# Patient Record
Sex: FEMALE | Race: OTHER | NOT HISPANIC OR LATINO | URBAN - METROPOLITAN AREA
[De-identification: names, ages, dates, MRNs, and addresses within clinical notes are randomized per-mention and may not be internally consistent; named-entity substitution may affect disease eponyms.]

---

## 2017-08-29 ENCOUNTER — OUTPATIENT (OUTPATIENT)
Dept: OUTPATIENT SERVICES | Facility: HOSPITAL | Age: 32
LOS: 1 days | End: 2017-08-29
Payer: COMMERCIAL

## 2017-08-29 DIAGNOSIS — O26.899 OTHER SPECIFIED PREGNANCY RELATED CONDITIONS, UNSPECIFIED TRIMESTER: ICD-10-CM

## 2017-08-29 DIAGNOSIS — Z3A.00 WEEKS OF GESTATION OF PREGNANCY NOT SPECIFIED: ICD-10-CM

## 2017-08-29 LAB
ALBUMIN SERPL ELPH-MCNC: 3.5 G/DL — SIGNIFICANT CHANGE UP (ref 3.3–5)
ALP SERPL-CCNC: 1573 U/L — HIGH (ref 40–120)
ALT FLD-CCNC: 16 U/L — SIGNIFICANT CHANGE UP (ref 10–45)
ANION GAP SERPL CALC-SCNC: 13 MMOL/L — SIGNIFICANT CHANGE UP (ref 5–17)
APPEARANCE UR: CLEAR — SIGNIFICANT CHANGE UP
APTT BLD: 27.3 SEC — LOW (ref 27.5–37.4)
AST SERPL-CCNC: 15 U/L — SIGNIFICANT CHANGE UP (ref 10–40)
BASOPHILS NFR BLD AUTO: 0.3 % — SIGNIFICANT CHANGE UP (ref 0–2)
BILIRUB SERPL-MCNC: 0.3 MG/DL — SIGNIFICANT CHANGE UP (ref 0.2–1.2)
BILIRUB UR-MCNC: NEGATIVE — SIGNIFICANT CHANGE UP
BUN SERPL-MCNC: 6 MG/DL — LOW (ref 7–23)
CALCIUM SERPL-MCNC: 9.2 MG/DL — SIGNIFICANT CHANGE UP (ref 8.4–10.5)
CHLORIDE SERPL-SCNC: 101 MMOL/L — SIGNIFICANT CHANGE UP (ref 96–108)
CO2 SERPL-SCNC: 21 MMOL/L — LOW (ref 22–31)
COLOR SPEC: YELLOW — SIGNIFICANT CHANGE UP
CREAT SERPL-MCNC: 0.5 MG/DL — SIGNIFICANT CHANGE UP (ref 0.5–1.3)
DIFF PNL FLD: NEGATIVE — SIGNIFICANT CHANGE UP
EOSINOPHIL NFR BLD AUTO: 1.5 % — SIGNIFICANT CHANGE UP (ref 0–6)
FIBRINOGEN PPP-MCNC: 586 MG/DL — HIGH (ref 258–438)
GLUCOSE SERPL-MCNC: 105 MG/DL — HIGH (ref 70–99)
GLUCOSE UR QL: NEGATIVE — SIGNIFICANT CHANGE UP
HCT VFR BLD CALC: 34.5 % — SIGNIFICANT CHANGE UP (ref 34.5–45)
HGB BLD-MCNC: 11.3 G/DL — LOW (ref 11.5–15.5)
INR BLD: 0.97 — SIGNIFICANT CHANGE UP (ref 0.88–1.16)
KETONES UR-MCNC: NEGATIVE — SIGNIFICANT CHANGE UP
LDH SERPL L TO P-CCNC: 152 U/L — SIGNIFICANT CHANGE UP (ref 50–242)
LEUKOCYTE ESTERASE UR-ACNC: NEGATIVE — SIGNIFICANT CHANGE UP
LYMPHOCYTES # BLD AUTO: 18.2 % — SIGNIFICANT CHANGE UP (ref 13–44)
MCHC RBC-ENTMCNC: 27.7 PG — SIGNIFICANT CHANGE UP (ref 27–34)
MCHC RBC-ENTMCNC: 32.8 G/DL — SIGNIFICANT CHANGE UP (ref 32–36)
MCV RBC AUTO: 84.6 FL — SIGNIFICANT CHANGE UP (ref 80–100)
MONOCYTES NFR BLD AUTO: 7.5 % — SIGNIFICANT CHANGE UP (ref 2–14)
NEUTROPHILS NFR BLD AUTO: 72.5 % — SIGNIFICANT CHANGE UP (ref 43–77)
NITRITE UR-MCNC: NEGATIVE — SIGNIFICANT CHANGE UP
PH UR: 5.5 — SIGNIFICANT CHANGE UP (ref 5–8)
PLATELET # BLD AUTO: 205 K/UL — SIGNIFICANT CHANGE UP (ref 150–400)
POTASSIUM SERPL-MCNC: 3.8 MMOL/L — SIGNIFICANT CHANGE UP (ref 3.5–5.3)
POTASSIUM SERPL-SCNC: 3.8 MMOL/L — SIGNIFICANT CHANGE UP (ref 3.5–5.3)
PROT SERPL-MCNC: 6.8 G/DL — SIGNIFICANT CHANGE UP (ref 6–8.3)
PROT UR-MCNC: NEGATIVE MG/DL — SIGNIFICANT CHANGE UP
PROTHROM AB SERPL-ACNC: 10.8 SEC — SIGNIFICANT CHANGE UP (ref 9.8–12.7)
RBC # BLD: 4.08 M/UL — SIGNIFICANT CHANGE UP (ref 3.8–5.2)
RBC # FLD: 14.9 % — SIGNIFICANT CHANGE UP (ref 10.3–16.9)
SODIUM SERPL-SCNC: 135 MMOL/L — SIGNIFICANT CHANGE UP (ref 135–145)
SP GR SPEC: 1.01 — SIGNIFICANT CHANGE UP (ref 1–1.03)
URATE SERPL-MCNC: 4 MG/DL — SIGNIFICANT CHANGE UP (ref 2.5–7)
UROBILINOGEN FLD QL: 0.2 E.U./DL — SIGNIFICANT CHANGE UP
WBC # BLD: 8.7 K/UL — SIGNIFICANT CHANGE UP (ref 3.8–10.5)
WBC # FLD AUTO: 8.7 K/UL — SIGNIFICANT CHANGE UP (ref 3.8–10.5)

## 2017-08-29 PROCEDURE — 99214 OFFICE O/P EST MOD 30 MIN: CPT

## 2017-08-29 PROCEDURE — 84550 ASSAY OF BLOOD/URIC ACID: CPT

## 2017-08-29 PROCEDURE — 85730 THROMBOPLASTIN TIME PARTIAL: CPT

## 2017-08-29 PROCEDURE — 81003 URINALYSIS AUTO W/O SCOPE: CPT

## 2017-08-29 PROCEDURE — 80053 COMPREHEN METABOLIC PANEL: CPT

## 2017-08-29 PROCEDURE — 85384 FIBRINOGEN ACTIVITY: CPT

## 2017-08-29 PROCEDURE — 83615 LACTATE (LD) (LDH) ENZYME: CPT

## 2017-08-29 PROCEDURE — 85025 COMPLETE CBC W/AUTO DIFF WBC: CPT

## 2017-08-29 PROCEDURE — 85610 PROTHROMBIN TIME: CPT

## 2017-09-13 ENCOUNTER — INPATIENT (INPATIENT)
Facility: HOSPITAL | Age: 32
LOS: 2 days | Discharge: HOME | End: 2017-09-16
Attending: OBSTETRICS & GYNECOLOGY | Admitting: OBSTETRICS & GYNECOLOGY
Payer: COMMERCIAL

## 2017-09-13 VITALS
HEIGHT: 62 IN | DIASTOLIC BLOOD PRESSURE: 63 MMHG | TEMPERATURE: 98 F | WEIGHT: 216.93 LBS | SYSTOLIC BLOOD PRESSURE: 102 MMHG

## 2017-09-13 DIAGNOSIS — Z3A.00 WEEKS OF GESTATION OF PREGNANCY NOT SPECIFIED: ICD-10-CM

## 2017-09-13 DIAGNOSIS — O26.899 OTHER SPECIFIED PREGNANCY RELATED CONDITIONS, UNSPECIFIED TRIMESTER: ICD-10-CM

## 2017-09-13 LAB
ALBUMIN SERPL ELPH-MCNC: 3.8 G/DL — SIGNIFICANT CHANGE UP (ref 3.3–5)
ALP SERPL-CCNC: 2200 U/L — HIGH (ref 40–120)
ALT FLD-CCNC: 17 U/L — SIGNIFICANT CHANGE UP (ref 10–45)
ANION GAP SERPL CALC-SCNC: 15 MMOL/L — SIGNIFICANT CHANGE UP (ref 5–17)
APPEARANCE UR: CLEAR — SIGNIFICANT CHANGE UP
APTT BLD: 27.3 SEC — LOW (ref 27.5–37.4)
AST SERPL-CCNC: 20 U/L — SIGNIFICANT CHANGE UP (ref 10–40)
BASOPHILS NFR BLD AUTO: 0.2 % — SIGNIFICANT CHANGE UP (ref 0–2)
BILIRUB SERPL-MCNC: 0.3 MG/DL — SIGNIFICANT CHANGE UP (ref 0.2–1.2)
BILIRUB UR-MCNC: NEGATIVE — SIGNIFICANT CHANGE UP
BLD GP AB SCN SERPL QL: NEGATIVE — SIGNIFICANT CHANGE UP
BLD GP AB SCN SERPL QL: NEGATIVE — SIGNIFICANT CHANGE UP
BUN SERPL-MCNC: 6 MG/DL — LOW (ref 7–23)
CALCIUM SERPL-MCNC: 9.6 MG/DL — SIGNIFICANT CHANGE UP (ref 8.4–10.5)
CHLORIDE SERPL-SCNC: 99 MMOL/L — SIGNIFICANT CHANGE UP (ref 96–108)
CO2 SERPL-SCNC: 24 MMOL/L — SIGNIFICANT CHANGE UP (ref 22–31)
COLOR SPEC: YELLOW — SIGNIFICANT CHANGE UP
CREAT SERPL-MCNC: 0.6 MG/DL — SIGNIFICANT CHANGE UP (ref 0.5–1.3)
DIFF PNL FLD: (no result)
EOSINOPHIL NFR BLD AUTO: 0.8 % — SIGNIFICANT CHANGE UP (ref 0–6)
FIBRINOGEN PPP-MCNC: 543 MG/DL — HIGH (ref 258–438)
GLUCOSE SERPL-MCNC: 75 MG/DL — SIGNIFICANT CHANGE UP (ref 70–99)
GLUCOSE UR QL: NEGATIVE — SIGNIFICANT CHANGE UP
HCT VFR BLD CALC: 37.7 % — SIGNIFICANT CHANGE UP (ref 34.5–45)
HGB BLD-MCNC: 12.5 G/DL — SIGNIFICANT CHANGE UP (ref 11.5–15.5)
INR BLD: 0.95 — SIGNIFICANT CHANGE UP (ref 0.88–1.16)
KETONES UR-MCNC: NEGATIVE — SIGNIFICANT CHANGE UP
LDH SERPL L TO P-CCNC: 185 U/L — SIGNIFICANT CHANGE UP (ref 50–242)
LEUKOCYTE ESTERASE UR-ACNC: (no result)
LYMPHOCYTES # BLD AUTO: 20.5 % — SIGNIFICANT CHANGE UP (ref 13–44)
MCHC RBC-ENTMCNC: 28.2 PG — SIGNIFICANT CHANGE UP (ref 27–34)
MCHC RBC-ENTMCNC: 33.2 G/DL — SIGNIFICANT CHANGE UP (ref 32–36)
MCV RBC AUTO: 84.9 FL — SIGNIFICANT CHANGE UP (ref 80–100)
MONOCYTES NFR BLD AUTO: 8.1 % — SIGNIFICANT CHANGE UP (ref 2–14)
NEUTROPHILS NFR BLD AUTO: 70.4 % — SIGNIFICANT CHANGE UP (ref 43–77)
NITRITE UR-MCNC: NEGATIVE — SIGNIFICANT CHANGE UP
PH UR: 6.5 — SIGNIFICANT CHANGE UP (ref 5–8)
PLATELET # BLD AUTO: 203 K/UL — SIGNIFICANT CHANGE UP (ref 150–400)
POTASSIUM SERPL-MCNC: 4.5 MMOL/L — SIGNIFICANT CHANGE UP (ref 3.5–5.3)
POTASSIUM SERPL-SCNC: 4.5 MMOL/L — SIGNIFICANT CHANGE UP (ref 3.5–5.3)
PROT SERPL-MCNC: 7.3 G/DL — SIGNIFICANT CHANGE UP (ref 6–8.3)
PROT UR-MCNC: NEGATIVE MG/DL — SIGNIFICANT CHANGE UP
PROTHROM AB SERPL-ACNC: 10.5 SEC — SIGNIFICANT CHANGE UP (ref 9.8–12.7)
RBC # BLD: 4.44 M/UL — SIGNIFICANT CHANGE UP (ref 3.8–5.2)
RBC # FLD: 15 % — SIGNIFICANT CHANGE UP (ref 10.3–16.9)
RH IG SCN BLD-IMP: NEGATIVE — SIGNIFICANT CHANGE UP
RH IG SCN BLD-IMP: NEGATIVE — SIGNIFICANT CHANGE UP
SODIUM SERPL-SCNC: 138 MMOL/L — SIGNIFICANT CHANGE UP (ref 135–145)
SP GR SPEC: <=1.005 — SIGNIFICANT CHANGE UP (ref 1–1.03)
URATE SERPL-MCNC: 5.2 MG/DL — SIGNIFICANT CHANGE UP (ref 2.5–7)
UROBILINOGEN FLD QL: 0.2 E.U./DL — SIGNIFICANT CHANGE UP
WBC # BLD: 9.5 K/UL — SIGNIFICANT CHANGE UP (ref 3.8–10.5)
WBC # FLD AUTO: 9.5 K/UL — SIGNIFICANT CHANGE UP (ref 3.8–10.5)

## 2017-09-13 PROCEDURE — 93010 ELECTROCARDIOGRAM REPORT: CPT

## 2017-09-13 PROCEDURE — 99222 1ST HOSP IP/OBS MODERATE 55: CPT

## 2017-09-13 RX ORDER — PENICILLIN G POTASSIUM 5000000 [IU]/1
5 POWDER, FOR SOLUTION INTRAMUSCULAR; INTRAPLEURAL; INTRATHECAL; INTRAVENOUS ONCE
Qty: 0 | Refills: 0 | Status: COMPLETED | OUTPATIENT
Start: 2017-09-13 | End: 2017-09-13

## 2017-09-13 RX ORDER — PENICILLIN G POTASSIUM 5000000 [IU]/1
POWDER, FOR SOLUTION INTRAMUSCULAR; INTRAPLEURAL; INTRATHECAL; INTRAVENOUS
Qty: 0 | Refills: 0 | Status: DISCONTINUED | OUTPATIENT
Start: 2017-09-13 | End: 2017-09-15

## 2017-09-13 RX ORDER — SODIUM CHLORIDE 9 MG/ML
1000 INJECTION, SOLUTION INTRAVENOUS
Qty: 0 | Refills: 0 | Status: DISCONTINUED | OUTPATIENT
Start: 2017-09-13 | End: 2017-09-13

## 2017-09-13 RX ORDER — PENICILLIN G POTASSIUM 5000000 [IU]/1
2.5 POWDER, FOR SOLUTION INTRAMUSCULAR; INTRAPLEURAL; INTRATHECAL; INTRAVENOUS EVERY 4 HOURS
Qty: 0 | Refills: 0 | Status: DISCONTINUED | OUTPATIENT
Start: 2017-09-13 | End: 2017-09-15

## 2017-09-13 RX ORDER — OXYTOCIN 10 UNIT/ML
333.33 VIAL (ML) INJECTION
Qty: 20 | Refills: 0 | Status: DISCONTINUED | OUTPATIENT
Start: 2017-09-13 | End: 2017-09-14

## 2017-09-13 RX ORDER — OXYTOCIN 10 UNIT/ML
1 VIAL (ML) INJECTION
Qty: 30 | Refills: 0 | Status: DISCONTINUED | OUTPATIENT
Start: 2017-09-13 | End: 2017-09-16

## 2017-09-13 RX ORDER — SODIUM CHLORIDE 9 MG/ML
1000 INJECTION, SOLUTION INTRAVENOUS
Qty: 0 | Refills: 0 | Status: DISCONTINUED | OUTPATIENT
Start: 2017-09-13 | End: 2017-09-16

## 2017-09-13 RX ORDER — SODIUM CHLORIDE 9 MG/ML
1000 INJECTION, SOLUTION INTRAVENOUS ONCE
Qty: 0 | Refills: 0 | Status: COMPLETED | OUTPATIENT
Start: 2017-09-13 | End: 2017-09-13

## 2017-09-13 RX ORDER — CITRIC ACID/SODIUM CITRATE 300-500 MG
15 SOLUTION, ORAL ORAL ONCE
Qty: 0 | Refills: 0 | Status: DISCONTINUED | OUTPATIENT
Start: 2017-09-13 | End: 2017-09-14

## 2017-09-13 RX ADMIN — SODIUM CHLORIDE 75 MILLILITER(S): 9 INJECTION, SOLUTION INTRAVENOUS at 22:41

## 2017-09-13 RX ADMIN — PENICILLIN G POTASSIUM 200 MILLION UNIT(S): 5000000 POWDER, FOR SOLUTION INTRAMUSCULAR; INTRAPLEURAL; INTRATHECAL; INTRAVENOUS at 22:41

## 2017-09-13 RX ADMIN — Medication 1 MILLIUNIT(S)/MIN: at 22:40

## 2017-09-13 RX ADMIN — SODIUM CHLORIDE 2000 MILLILITER(S): 9 INJECTION, SOLUTION INTRAVENOUS at 18:00

## 2017-09-13 RX ADMIN — SODIUM CHLORIDE 75 MILLILITER(S): 9 INJECTION, SOLUTION INTRAVENOUS at 18:57

## 2017-09-13 RX ADMIN — PENICILLIN G POTASSIUM 200 MILLION UNIT(S): 5000000 POWDER, FOR SOLUTION INTRAMUSCULAR; INTRAPLEURAL; INTRATHECAL; INTRAVENOUS at 18:00

## 2017-09-13 NOTE — PATIENT PROFILE OB - PRENATAL LABORATORY TESTS, INFANT PROFILE
group B strep/blood type/antibody screen VDRL/RPR/HIV/blood type/group B strep/rubella/antibody screen

## 2017-09-13 NOTE — CONSULT NOTE ADULT - ASSESSMENT
32 y/o F with no Cardiac / Arrhythmia Hx now in Labor with contractions and bradycardia likely from increased vagal tone, no syncope, hemodynamically stable. Occasional PVCs noted on EKG.    Increased vagal tone expected in current state and will likely continue with contractions  Agree with hydration  Check electrolytes and replete as needed  PVCs benign and asymptomatic, no treatment required  Will follow post delivery    Case discussed with Dr Dyer 32 y/o F with no Cardiac / Arrhythmia Hx now in Labor with contractions and bradycardia likely from increased vagal tone, no syncope, hemodynamically stable. Occasional PVCs noted on EKG.    Increased vagal tone expected in current state and will likely continue with contractions, no intervention needed for sinus bradycardia at this time  Agree with hydration  Check electrolytes and replete as needed, including Magnesium  PVCs benign and asymptomatic, recommend Echocardiogram post-partem  Will follow post delivery    Case discussed with Dr Dyer

## 2017-09-13 NOTE — PATIENT PROFILE OB - BABY A: MECONIUM PRESENT, DELIVERY
Patient: Keri Nielson    Procedure Summary     Date Anesthesia Start Anesthesia Stop Room / Location    06/22/17 [id number]  [facility] /  [facility]       Procedure Diagnosis Surgeon Provider    ESOPHAGOGASTRODUODENOSCOPY WITH COLD BIOPSIES AND 56 [place] GALLEGOS DILATION (N/A Esophagus) Dyspepsia; Other dysphagia  (Dyspepsia [R10.13]; Other dysphagia [R13.19]) MD Abel Gomes MD          Anesthesia Type: MAC  Last vitals  BP      Temp      Pulse     Resp      SpO2        Post Anesthesia Care and Evaluation    Patient participation: complete - patient participated  Level of consciousness: awake  Pain management: adequate  Airway patency: patent  Anesthetic complications: No anesthetic complications    Cardiovascular status: acceptable  Respiratory status: acceptable  Hydration status: acceptable      
no

## 2017-09-13 NOTE — CONSULT NOTE ADULT - SUBJECTIVE AND OBJECTIVE BOX
Patient is a 31y old  Female who presents with a chief complaint of     HPI:      PAST MEDICAL & SURGICAL HISTORY:      PREVIOUS DIAGNOSTIC TESTING:      ECHO  FINDINGS:    STRESS  FINDINGS:    CATHETERIZATION  FINDINGS:    MEDICATIONS  (STANDING):  citric acid/sodium citrate Solution 15 milliLiter(s) Oral Once  oxytocin Infusion 333.333 milliUNIT(s)/Min (1000 mL/Hr) IV Continuous <Continuous>  penicillin   G  potassium  IVPB      penicillin   G  potassium  IVPB 2.5 Million Unit(s) IV Intermittent every 4 hours  lactated ringers. 1000 milliLiter(s) (75 mL/Hr) IV Continuous <Continuous>  oxytocin Infusion 1 milliUNIT(s)/Min (1 mL/Hr) IV Continuous <Continuous>    MEDICATIONS  (PRN):      FAMILY HISTORY:      SOCIAL HISTORY:    CIGARETTES:    ALCOHOL:    REVIEW OF SYSTEMS      General:	    Skin/Breast:  	  Ophthalmologic:  	  ENMT:	    Respiratory and Thorax:  	  Cardiovascular:	    Gastrointestinal:	    Genitourinary:	    Musculoskeletal:	    Neurological:	    Psychiatric:	    Hematology/Lymphatics:	    Endocrine:	    Allergic/Immunologic:	    Vital Signs Last 24 Hrs  T(C): --  T(F): --  HR: --  BP: --  BP(mean): --  RR: --  SpO2: --    PHYSICAL EXAM:      Constitutional:    Eyes:    ENMT:    Neck:    Breasts:    Back:    Respiratory:    Cardiovascular:    Gastrointestinal:    Genitourinary:    Rectal:    Extremities:    Vascular:    Neurological:    Skin:    Lymph Nodes:    Musculoskeletal:    Psychiatric:            TELEMETRY:    ECG:    I&O's Detail    13 Sep 2017 07:01  -  13 Sep 2017 18:59  --------------------------------------------------------  IN:    lactated ringers.: 1000 mL  Total IN: 1000 mL    OUT:  Total OUT: 0 mL    Total NET: 1000 mL          LABS: Patient is a 31y old  Female who presents with a chief complaint of     HPI:  30 y/o F with no significant Cardiac or medical HX presents to [facility] in labor (3rd pregnancy, previous Hx of Pre-eclampsia), currently in Labor with contractions, Cardiology consulted for bradycardic episodes and PVCs.    Bradycardia associated with contraction episodes, BPs stable during bradycardia, HR ~ 30s in sinus rhythm.    Patient has good functional status otherwise, uncomplicated pregnancy      PAST MEDICAL & SURGICAL HISTORY:  None significant      MEDICATIONS  (STANDING):  citric acid/sodium citrate Solution 15 milliLiter(s) Oral Once  oxytocin Infusion 333.333 milliUNIT(s)/Min (1000 mL/Hr) IV Continuous <Continuous>  penicillin   G  potassium  IVPB      penicillin   G  potassium  IVPB 2.5 Million Unit(s) IV Intermittent every 4 hours  lactated ringers. 1000 milliLiter(s) (75 mL/Hr) IV Continuous <Continuous>  oxytocin Infusion 1 milliUNIT(s)/Min (1 mL/Hr) IV Continuous <Continuous>    MEDICATIONS  (PRN):      FAMILY HISTORY:  No family Hx of Heart disease    SOCIAL HISTORY:  CIGARETTES: denied    ALCOHOL:       REVIEW OF SYSTEMS  Currently in Labor, having contractions      Vital Signs Last 24 Hrs      PHYSICAL EXAM:  GEN: AAOx3, currently contractions  HEENT: WNL  RESP: CTAB  CV: S1, S2, no MR  ABD: Pregnant female, contraction noted during exam  EXT: No edema, pulses x4        ECG: NSR HR 94, PVCs    Monitor noted Michael episodes associated with contractions        I&O's Detail    13 Sep 2017 07:01  -  13 Sep 2017 18:59  --------------------------------------------------------  IN:    lactated ringers.: 1000 mL  Total IN: 1000 mL    OUT:  Total OUT: 0 mL    Total NET: 1000 mL Patient is a 31y old  Female who presents with a chief complaint of     HPI:  30 y/o F with no significant Cardiac or medical HX presents to [facility] in labor (3rd pregnancy, previous Hx of Pre-eclampsia), currently in Labor with contractions, Cardiology consulted for bradycardic episodes and PVCs during labor.    Bradycardia associated with contraction episodes, BPs stable during bradycardia, HR ~ 30s in sinus rhythm.    Patient has good functional status otherwise, uncomplicated pregnancy, no Hx of Syncope, palpitations, chest pain, SOB.      PAST MEDICAL & SURGICAL HISTORY:  None significant      MEDICATIONS  (STANDING):  citric acid/sodium citrate Solution 15 milliLiter(s) Oral Once  oxytocin Infusion 333.333 milliUNIT(s)/Min (1000 mL/Hr) IV Continuous <Continuous>  penicillin   G  potassium  IVPB      penicillin   G  potassium  IVPB 2.5 Million Unit(s) IV Intermittent every 4 hours  lactated ringers. 1000 milliLiter(s) (75 mL/Hr) IV Continuous <Continuous>  oxytocin Infusion 1 milliUNIT(s)/Min (1 mL/Hr) IV Continuous <Continuous>    MEDICATIONS  (PRN):      FAMILY HISTORY:  No family Hx of Heart disease    SOCIAL HISTORY:  CIGARETTES: denied    ALCOHOL: occasional, none during pregnancy      REVIEW OF SYSTEMS  Currently in Labor, having contractions  As above    Vital Signs Last 24 Hrs  /63, HR 70, Room Air SpO2 99, RR 30  PHYSICAL EXAM:   GEN: AAOx3, currently contractions  HEENT: WNL  RESP: CTAB  CV: S1, S2, no MR  ABD: Pregnant female, contraction noted during exam  EXT: No edema, pulses x4        ECG: NSR HR 94, PVCs    Monitor noted Michael episodes associated with contractions        I&O's Detail    13 Sep 2017 07:01  -  13 Sep 2017 18:59  --------------------------------------------------------  IN:    lactated ringers.: 1000 mL  Total IN: 1000 mL    OUT:  Total OUT: 0 mL    Total NET: 1000 mL

## 2017-09-14 LAB — T PALLIDUM AB TITR SER: NEGATIVE — SIGNIFICANT CHANGE UP

## 2017-09-14 PROCEDURE — 93306 TTE W/DOPPLER COMPLETE: CPT | Mod: 26

## 2017-09-14 RX ORDER — MAGNESIUM HYDROXIDE 400 MG/1
30 TABLET, CHEWABLE ORAL
Qty: 0 | Refills: 0 | Status: DISCONTINUED | OUTPATIENT
Start: 2017-09-14 | End: 2017-09-15

## 2017-09-14 RX ORDER — ACETAMINOPHEN 500 MG
650 TABLET ORAL EVERY 6 HOURS
Qty: 0 | Refills: 0 | Status: DISCONTINUED | OUTPATIENT
Start: 2017-09-14 | End: 2017-09-16

## 2017-09-14 RX ORDER — LANOLIN
1 OINTMENT (GRAM) TOPICAL EVERY 6 HOURS
Qty: 0 | Refills: 0 | Status: DISCONTINUED | OUTPATIENT
Start: 2017-09-14 | End: 2017-09-16

## 2017-09-14 RX ORDER — HYDROCORTISONE 1 %
1 OINTMENT (GRAM) TOPICAL EVERY 4 HOURS
Qty: 0 | Refills: 0 | Status: DISCONTINUED | OUTPATIENT
Start: 2017-09-14 | End: 2017-09-16

## 2017-09-14 RX ORDER — GLYCERIN ADULT
1 SUPPOSITORY, RECTAL RECTAL AT BEDTIME
Qty: 0 | Refills: 0 | Status: DISCONTINUED | OUTPATIENT
Start: 2017-09-14 | End: 2017-09-16

## 2017-09-14 RX ORDER — AER TRAVELER 0.5 G/1
1 SOLUTION RECTAL; TOPICAL EVERY 4 HOURS
Qty: 0 | Refills: 0 | Status: DISCONTINUED | OUTPATIENT
Start: 2017-09-14 | End: 2017-09-16

## 2017-09-14 RX ORDER — OXYCODONE AND ACETAMINOPHEN 5; 325 MG/1; MG/1
2 TABLET ORAL EVERY 6 HOURS
Qty: 0 | Refills: 0 | Status: DISCONTINUED | OUTPATIENT
Start: 2017-09-14 | End: 2017-09-16

## 2017-09-14 RX ORDER — DIPHENHYDRAMINE HCL 50 MG
25 CAPSULE ORAL EVERY 6 HOURS
Qty: 0 | Refills: 0 | Status: DISCONTINUED | OUTPATIENT
Start: 2017-09-14 | End: 2017-09-16

## 2017-09-14 RX ORDER — SODIUM CHLORIDE 9 MG/ML
3 INJECTION INTRAMUSCULAR; INTRAVENOUS; SUBCUTANEOUS EVERY 8 HOURS
Qty: 0 | Refills: 0 | Status: DISCONTINUED | OUTPATIENT
Start: 2017-09-14 | End: 2017-09-16

## 2017-09-14 RX ORDER — DIBUCAINE 1 %
1 OINTMENT (GRAM) RECTAL EVERY 4 HOURS
Qty: 0 | Refills: 0 | Status: DISCONTINUED | OUTPATIENT
Start: 2017-09-14 | End: 2017-09-16

## 2017-09-14 RX ORDER — OXYTOCIN 10 UNIT/ML
41.67 VIAL (ML) INJECTION
Qty: 20 | Refills: 0 | Status: DISCONTINUED | OUTPATIENT
Start: 2017-09-14 | End: 2017-09-16

## 2017-09-14 RX ORDER — DOCUSATE SODIUM 100 MG
100 CAPSULE ORAL
Qty: 0 | Refills: 0 | Status: DISCONTINUED | OUTPATIENT
Start: 2017-09-14 | End: 2017-09-16

## 2017-09-14 RX ORDER — INFLUENZA VIRUS VACCINE 15; 15; 15; 15 UG/.5ML; UG/.5ML; UG/.5ML; UG/.5ML
0.5 SUSPENSION INTRAMUSCULAR ONCE
Qty: 0 | Refills: 0 | Status: COMPLETED | OUTPATIENT
Start: 2017-09-14 | End: 2017-09-14

## 2017-09-14 RX ORDER — IBUPROFEN 200 MG
600 TABLET ORAL EVERY 6 HOURS
Qty: 0 | Refills: 0 | Status: DISCONTINUED | OUTPATIENT
Start: 2017-09-14 | End: 2017-09-16

## 2017-09-14 RX ORDER — BENZOCAINE 10 %
1 GEL (GRAM) MUCOUS MEMBRANE EVERY 6 HOURS
Qty: 0 | Refills: 0 | Status: DISCONTINUED | OUTPATIENT
Start: 2017-09-14 | End: 2017-09-16

## 2017-09-14 RX ORDER — SIMETHICONE 80 MG/1
80 TABLET, CHEWABLE ORAL EVERY 6 HOURS
Qty: 0 | Refills: 0 | Status: DISCONTINUED | OUTPATIENT
Start: 2017-09-14 | End: 2017-09-15

## 2017-09-14 RX ORDER — TETANUS TOXOID, REDUCED DIPHTHERIA TOXOID AND ACELLULAR PERTUSSIS VACCINE, ADSORBED 5; 2.5; 8; 8; 2.5 [IU]/.5ML; [IU]/.5ML; UG/.5ML; UG/.5ML; UG/.5ML
0.5 SUSPENSION INTRAMUSCULAR ONCE
Qty: 0 | Refills: 0 | Status: COMPLETED | OUTPATIENT
Start: 2017-09-14 | End: 2018-08-13

## 2017-09-14 RX ADMIN — OXYCODONE AND ACETAMINOPHEN 2 TABLET(S): 5; 325 TABLET ORAL at 21:29

## 2017-09-14 RX ADMIN — SIMETHICONE 80 MILLIGRAM(S): 80 TABLET, CHEWABLE ORAL at 21:28

## 2017-09-14 RX ADMIN — Medication 100 MILLIGRAM(S): at 12:07

## 2017-09-14 RX ADMIN — OXYCODONE AND ACETAMINOPHEN 2 TABLET(S): 5; 325 TABLET ORAL at 22:29

## 2017-09-14 RX ADMIN — Medication 100 MILLIGRAM(S): at 21:31

## 2017-09-14 RX ADMIN — INFLUENZA VIRUS VACCINE 0.5 MILLILITER(S): 15; 15; 15; 15 SUSPENSION INTRAMUSCULAR at 12:07

## 2017-09-14 RX ADMIN — Medication 1 TABLET(S): at 12:07

## 2017-09-14 RX ADMIN — Medication 600 MILLIGRAM(S): at 20:16

## 2017-09-14 RX ADMIN — SODIUM CHLORIDE 3 MILLILITER(S): 9 INJECTION INTRAMUSCULAR; INTRAVENOUS; SUBCUTANEOUS at 22:00

## 2017-09-14 RX ADMIN — Medication 600 MILLIGRAM(S): at 17:58

## 2017-09-14 RX ADMIN — Medication 125 MILLIUNIT(S)/MIN: at 02:15

## 2017-09-14 RX ADMIN — Medication 600 MILLIGRAM(S): at 12:06

## 2017-09-15 LAB
ALBUMIN SERPL ELPH-MCNC: 2.9 G/DL — LOW (ref 3.3–5)
ALP SERPL-CCNC: 1111 U/L — HIGH (ref 40–120)
ALT FLD-CCNC: 17 U/L — SIGNIFICANT CHANGE UP (ref 10–45)
AMYLASE P1 CFR SERPL: 32 U/L — SIGNIFICANT CHANGE UP (ref 25–125)
ANION GAP SERPL CALC-SCNC: 12 MMOL/L — SIGNIFICANT CHANGE UP (ref 5–17)
APPEARANCE UR: CLEAR — SIGNIFICANT CHANGE UP
APTT BLD: 31.2 SEC — SIGNIFICANT CHANGE UP (ref 27.5–37.4)
AST SERPL-CCNC: 18 U/L — SIGNIFICANT CHANGE UP (ref 10–40)
BASOPHILS NFR BLD AUTO: 0.5 % — SIGNIFICANT CHANGE UP (ref 0–2)
BILIRUB SERPL-MCNC: 0.2 MG/DL — SIGNIFICANT CHANGE UP (ref 0.2–1.2)
BILIRUB UR-MCNC: NEGATIVE — SIGNIFICANT CHANGE UP
BUN SERPL-MCNC: 5 MG/DL — LOW (ref 7–23)
CALCIUM SERPL-MCNC: 9 MG/DL — SIGNIFICANT CHANGE UP (ref 8.4–10.5)
CHLORIDE SERPL-SCNC: 101 MMOL/L — SIGNIFICANT CHANGE UP (ref 96–108)
CO2 SERPL-SCNC: 26 MMOL/L — SIGNIFICANT CHANGE UP (ref 22–31)
COLOR SPEC: YELLOW — SIGNIFICANT CHANGE UP
CREAT SERPL-MCNC: 0.67 MG/DL — SIGNIFICANT CHANGE UP (ref 0.5–1.3)
DIFF PNL FLD: (no result)
EOSINOPHIL NFR BLD AUTO: 1.7 % — SIGNIFICANT CHANGE UP (ref 0–6)
FIBRINOGEN PPP-MCNC: 436 MG/DL — SIGNIFICANT CHANGE UP (ref 258–438)
GLUCOSE SERPL-MCNC: 84 MG/DL — SIGNIFICANT CHANGE UP (ref 70–99)
GLUCOSE UR QL: NEGATIVE — SIGNIFICANT CHANGE UP
HCT VFR BLD CALC: 33.7 % — LOW (ref 34.5–45)
HGB BLD-MCNC: 11 G/DL — LOW (ref 11.5–15.5)
INR BLD: 0.89 — SIGNIFICANT CHANGE UP (ref 0.88–1.16)
KETONES UR-MCNC: NEGATIVE — SIGNIFICANT CHANGE UP
LDH SERPL L TO P-CCNC: 163 U/L — SIGNIFICANT CHANGE UP (ref 50–242)
LEUKOCYTE ESTERASE UR-ACNC: (no result)
LIDOCAIN IGE QN: 24 U/L — SIGNIFICANT CHANGE UP (ref 7–60)
LYMPHOCYTES # BLD AUTO: 18.2 % — SIGNIFICANT CHANGE UP (ref 13–44)
MCHC RBC-ENTMCNC: 28.1 PG — SIGNIFICANT CHANGE UP (ref 27–34)
MCHC RBC-ENTMCNC: 32.6 G/DL — SIGNIFICANT CHANGE UP (ref 32–36)
MCV RBC AUTO: 86 FL — SIGNIFICANT CHANGE UP (ref 80–100)
MONOCYTES NFR BLD AUTO: 7 % — SIGNIFICANT CHANGE UP (ref 2–14)
NEUTROPHILS NFR BLD AUTO: 72.6 % — SIGNIFICANT CHANGE UP (ref 43–77)
NITRITE UR-MCNC: NEGATIVE — SIGNIFICANT CHANGE UP
PH UR: 6 — SIGNIFICANT CHANGE UP (ref 5–8)
PLATELET # BLD AUTO: 140 K/UL — LOW (ref 150–400)
POTASSIUM SERPL-MCNC: 4 MMOL/L — SIGNIFICANT CHANGE UP (ref 3.5–5.3)
POTASSIUM SERPL-SCNC: 4 MMOL/L — SIGNIFICANT CHANGE UP (ref 3.5–5.3)
PROT SERPL-MCNC: 5.7 G/DL — LOW (ref 6–8.3)
PROT UR-MCNC: NEGATIVE MG/DL — SIGNIFICANT CHANGE UP
PROTHROM AB SERPL-ACNC: 9.9 SEC — SIGNIFICANT CHANGE UP (ref 9.8–12.7)
RBC # BLD: 3.92 M/UL — SIGNIFICANT CHANGE UP (ref 3.8–5.2)
RBC # FLD: 14.8 % — SIGNIFICANT CHANGE UP (ref 10.3–16.9)
SODIUM SERPL-SCNC: 139 MMOL/L — SIGNIFICANT CHANGE UP (ref 135–145)
SP GR SPEC: 1.02 — SIGNIFICANT CHANGE UP (ref 1–1.03)
URATE SERPL-MCNC: 4.7 MG/DL — SIGNIFICANT CHANGE UP (ref 2.5–7)
UROBILINOGEN FLD QL: 0.2 E.U./DL — SIGNIFICANT CHANGE UP
WBC # BLD: 6.4 K/UL — SIGNIFICANT CHANGE UP (ref 3.8–10.5)
WBC # FLD AUTO: 6.4 K/UL — SIGNIFICANT CHANGE UP (ref 3.8–10.5)

## 2017-09-15 PROCEDURE — 76705 ECHO EXAM OF ABDOMEN: CPT | Mod: 26

## 2017-09-15 RX ORDER — FERROUS SULFATE 325(65) MG
1 TABLET ORAL
Qty: 0 | Refills: 0 | COMMUNITY

## 2017-09-15 RX ORDER — FAMOTIDINE 10 MG/ML
20 INJECTION INTRAVENOUS DAILY
Qty: 0 | Refills: 0 | Status: DISCONTINUED | OUTPATIENT
Start: 2017-09-15 | End: 2017-09-15

## 2017-09-15 RX ORDER — SIMETHICONE 80 MG/1
80 TABLET, CHEWABLE ORAL EVERY 6 HOURS
Qty: 0 | Refills: 0 | Status: DISCONTINUED | OUTPATIENT
Start: 2017-09-15 | End: 2017-09-16

## 2017-09-15 RX ORDER — MAGNESIUM HYDROXIDE 400 MG/1
30 TABLET, CHEWABLE ORAL
Qty: 0 | Refills: 0 | Status: DISCONTINUED | OUTPATIENT
Start: 2017-09-15 | End: 2017-09-16

## 2017-09-15 RX ORDER — FAMOTIDINE 10 MG/ML
20 INJECTION INTRAVENOUS DAILY
Qty: 0 | Refills: 0 | Status: DISCONTINUED | OUTPATIENT
Start: 2017-09-15 | End: 2017-09-16

## 2017-09-15 RX ADMIN — Medication 100 MILLIGRAM(S): at 22:08

## 2017-09-15 RX ADMIN — SIMETHICONE 80 MILLIGRAM(S): 80 TABLET, CHEWABLE ORAL at 06:39

## 2017-09-15 RX ADMIN — SODIUM CHLORIDE 3 MILLILITER(S): 9 INJECTION INTRAMUSCULAR; INTRAVENOUS; SUBCUTANEOUS at 06:22

## 2017-09-15 RX ADMIN — SODIUM CHLORIDE 3 MILLILITER(S): 9 INJECTION INTRAMUSCULAR; INTRAVENOUS; SUBCUTANEOUS at 13:21

## 2017-09-15 RX ADMIN — Medication 1 TABLET(S): at 13:21

## 2017-09-15 RX ADMIN — Medication 600 MILLIGRAM(S): at 23:59

## 2017-09-15 RX ADMIN — SIMETHICONE 80 MILLIGRAM(S): 80 TABLET, CHEWABLE ORAL at 13:21

## 2017-09-15 RX ADMIN — Medication 600 MILLIGRAM(S): at 13:21

## 2017-09-15 RX ADMIN — Medication 600 MILLIGRAM(S): at 14:35

## 2017-09-15 RX ADMIN — Medication 600 MILLIGRAM(S): at 01:53

## 2017-09-15 RX ADMIN — OXYCODONE AND ACETAMINOPHEN 2 TABLET(S): 5; 325 TABLET ORAL at 06:39

## 2017-09-15 RX ADMIN — Medication 600 MILLIGRAM(S): at 22:10

## 2017-09-15 RX ADMIN — SIMETHICONE 80 MILLIGRAM(S): 80 TABLET, CHEWABLE ORAL at 18:05

## 2017-09-15 RX ADMIN — FAMOTIDINE 20 MILLIGRAM(S): 10 INJECTION INTRAVENOUS at 22:28

## 2017-09-15 RX ADMIN — Medication 600 MILLIGRAM(S): at 02:50

## 2017-09-15 RX ADMIN — Medication 600 MILLIGRAM(S): at 13:19

## 2017-09-15 NOTE — DISCHARGE NOTE OB - MEDICATION SUMMARY - MEDICATIONS TO STOP TAKING
I will STOP taking the medications listed below when I get home from the hospital:    Prenatal 1 oral capsule  -- 1 cap(s) by mouth once a day    Feosol 325 mg (65 mg elemental iron) oral tablet  -- 1 tab(s) by mouth 1 to 3 times a day

## 2017-09-15 NOTE — DISCHARGE NOTE OB - AVOID SITTING IN ONE POSITION FOR MORE THAN ONE HOUR
[facility] History





-  Admission Detail


Date of Service: 17


[facility] Admission Detail: 





Term, AGA, female delivered vaginally to a 24 yo ->1, GBS-, A- mom.





- Maternal History


Maternal MR Number: [id number]


: 1


Term: 1


: 0


Abortions: 0


Live Births: 1


Mother's Blood Type: A


Maternal Hepatitis B: Negative


Maternal STD: Negative


Maternal Group Beta Strep/GBS: Negative


Prenatal Care Received: Yes


MD Office Called for Prenatal Records: Yes





- Delivery Data


APGAR Total Score 1 Minute: 7


APGAR Total Score 5 Minutes: 9


Resuscitation Effort: Bulb Suction, Dried and Stimulated





 Nursery Information


Sex, Infant: Female


Weight: 2.883 kg


Length: 50.8 cm


Head Circumference: 31.75 cm


Abdominal Girth: 31.75 cm


Bed Type: Open Crib





 Physician Exam





- Exam


Exam: See Below


Head: abnormal shape, bruising, other (broad forehead, shortened A-P diameter 

of skull, overlapping sutures, forehead bruising, right parietal caput)


Eyes: bilateral: other (hyperteloric)


Ears: other (right ear pinna thinning)


Nose: other (mild flattening of the right, depressed nasal bridge)


Mouth: other (right upper gum line with white bump (Rohini corey))


Chest/Cardiovascular: normal appearance


Respiratory: lungs clear


Abdomen/GI: normal bowel sounds


Spine/Skeletal: other (sacral dimple, base visualized)





[facility] Assessment and Plan


(1) Term  delivered vaginally, current hospitalization


SNOMED Code(s): 937886656


   Code(s): Z38.00 - SINGLE LIVEBORN INFANT, DELIVERED VAGINALLY   Status: 

Acute   Current Visit: Yes   





(2) Hypertelorism


SNOMED Code(s): 12485966


   Code(s): Q75.2 - HYPERTELORISM   Status: Acute   Current Visit: Yes   





(3) Abnormal head shape


SNOMED Code(s): 327560406


   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 

UNSPECIFIED   Status: Acute   Current Visit: Yes   





(4) Sacral dimple in 


SNOMED Code(s): 511790270


   Code(s): P83.8 - OTHER SPECIFIED CONDITIONS OF INTEGUMENT SPECIFIC TO 

; Q82.6 - CONGENITAL SACRAL DIMPLE   Status: Acute   Current Visit: Yes   





(5) Rohini's corey of mouth


SNOMED Code(s): 967051231


   Code(s): K09.8 - OTHER CYSTS OF ORAL REGION, NOT ELSEWHERE CLASSIFIED   

Status: Acute   Current Visit: Yes   





(6) Deformity, pinna acquired


SNOMED Code(s): 2041466


   Code(s): H61.119 - ACQUIRED DEFORMITY OF PINNA, UNSPECIFIED EAR   Status: 

Acute   Current Visit: Yes   





(7) Caput succedaneum


SNOMED Code(s): 56617360


   Code(s): P12.81 - CAPUT SUCCEDANEUM   Status: Acute   Current Visit: Yes   


Problem List Initiated/Reviewed/Updated: Yes


Orders (Last 24 Hours): 


 Active Orders 24 hr











 Category Date Time Status


 


 Patient Status [ADT] Routine ADT  17 23:45 Active


 


 Blood Glucose Check, Bedside [RC] Care  17 23:47 Active


 


 Communication Order [RC] ASDIRECTED Care  17 23:45 Active


 


 Intake and Output [RC] QSHIFT Care  17 23:45 Active


 


 [facility] Hearing Screen [RC] ROUTINE Care  17 23:45 Active


 


 Notify Provider [RC] PRN Care  17 23:45 Active


 


 Vital Measures, [facility] [RC] Per Unit Routine Care  17 23:45 Active


 


 Breast Milk [DIET] Diet  17 Dinner Active


 


  SCREENING (STATE) [POC] Routine Lab  17 23:45 Ordered


 


 Resuscitation Status Routine Resus Stat  17 23:45 Ordered











Plan: 





Expect normal  care with the exception that pt's sutures will need 

evaluation - concern for Crouzon syndrome (vs Aperts/Ray).








UP TO DATE:





Crouzon syndrome (craniofacial dysostosis type I, RAYNA #[id number]) is autosomal 

dominant and occurs at a frequency of 16 in 1 million livebirths. Crouzon 

syndrome is predominantly caused by mutations in fibroblast growth factor 

receptor 2 (FGFR2), although fibroblast growth factor receptor 3 (FGFR3) 

mutations have been identified in individuals with Crouzon syndrome and 

acanthosis nigricans. A link between advanced paternal age and sporadic cases 

of Crouzon syndrome has also been demonstrated, as is often seen in autosomal 

dominant disorders caused by new mutations.


Crouzon syndrome is characterized by a tall, flattened forehead (secondary to 

bicoronal synostosis), proptosis, a beaked nose, and midface hypoplasia. The 

degree of facial deformity is milder than that of Apert syndrome, and cleft 

palate is rare. Also in contrast to Apert syndrome, patients with Crouzon 

syndrome typically have structurally normal hands and feet, as well as normal 

intelligence.


Cervical spine abnormalities occur in up to one-third of patients and must be 

thoroughly evaluated before surgical correction of craniosynostosis. The 

surgical treatment is similar in timing and technique to that used in Apert 

syndrome.
Statement Selected

## 2017-09-15 NOTE — PROGRESS NOTE ADULT - ASSESSMENT
A/P  31y s/p , PPD #1, stable  1. Pain: epigastric pain likely due to constipation and not passing gas; will give stool softeners, simethicone, and pepcid; if pain persists than can consider more serious causes of epigastric pain (i.e preeclampsia); continue motrin and percocet  2. GI: Regular diet  3. : voiding spontaneously  4. DVT prophylaxis: ambulation  5. Dispo: PPD# 2, unless otherwise specified

## 2017-09-15 NOTE — DISCHARGE NOTE OB - CARE PROVIDER_API CALL
Alivia Klein), Obstetrics and Gynecology  [address] [place] [address]  Phone: [phone]  Fax: [phone]

## 2017-09-15 NOTE — DISCHARGE NOTE OB - PATIENT PORTAL LINK FT
“You can access the Follow[facility] Patient Portal, offered by [facility], by registering with the following website: [email or web address]”

## 2017-09-15 NOTE — DISCHARGE NOTE OB - CARE PLAN
Principal Discharge DX:	39 weeks gestation of pregnancy  Goal:	follow up 2 weeks  Instructions for follow-up, activity and diet:	follow up 2 weeks, pelvic rest 6 weeks

## 2017-09-15 NOTE — PROGRESS NOTE ADULT - SUBJECTIVE AND OBJECTIVE BOX
Patient evaluated at bedside. No acute events overnight.  She reports pain is moderately controlled. Reports epigastric pain, nonradiating. Motrin providing some relief. Has not passed flatus or had a bowel movement.   She denies heavy vaginal bleeding or perineal discomfort.  She has been ambulating without assistance, voiding spontaneously, and is breastfeeding.    Physical Exam:  T(C): 36.6 (09-15-17 @ 06:00), Max: 36.6 (09-15-17 @ 06:00)  HR: 80 (09-15-17 @ 06:00) (80 - 83)  BP: 105/71 (09-15-17 @ 06:00) (105/71 - 111/64)  RR: 18 (09-15-17 @ 06:00) (18 - 18)  SpO2: 97% (09-15-17 @ 06:00) (97% - 98%)  Wt(kg): --    General: no acute distress, AAO x3  Cardiovascular: RRR, normal S1 and S2, no murmurs, rubs, or gallops  Respiratory: no increased work of breathing, lungs clear to auscultation bilaterally  Abdomen: soft, nontender, nondistended, no rebound or guarding, uterus firm at midline, fundus below umbilicus  Genitourinary: lochia WNL  Extremities: no swelling or calf tenderness                          12.5   9.5   )-----------( 203      ( 13 Sep 2017 19:44 )             37.7     09-13    138  |  99  |  6<L>  ----------------------------<  75  4.5   |  24  |  0.60    Ca    9.6      13 Sep 2017 19:44    TPro  7.3  /  Alb  3.8  /  TBili  0.3  /  DBili  x   /  AST  20  /  ALT  17  /  AlkPhos  2200<H>  09-13

## 2017-09-16 VITALS
SYSTOLIC BLOOD PRESSURE: 101 MMHG | OXYGEN SATURATION: 96 % | TEMPERATURE: 97 F | DIASTOLIC BLOOD PRESSURE: 55 MMHG | HEART RATE: 76 BPM | RESPIRATION RATE: 20 BRPM

## 2017-09-16 LAB
ALBUMIN SERPL ELPH-MCNC: 2.9 G/DL — LOW (ref 3.3–5)
ALP SERPL-CCNC: 975 U/L — HIGH (ref 40–120)
ALT FLD-CCNC: 23 U/L — SIGNIFICANT CHANGE UP (ref 10–45)
ANION GAP SERPL CALC-SCNC: 11 MMOL/L — SIGNIFICANT CHANGE UP (ref 5–17)
AST SERPL-CCNC: 23 U/L — SIGNIFICANT CHANGE UP (ref 10–40)
BILIRUB SERPL-MCNC: 0.2 MG/DL — SIGNIFICANT CHANGE UP (ref 0.2–1.2)
BUN SERPL-MCNC: 8 MG/DL — SIGNIFICANT CHANGE UP (ref 7–23)
CALCIUM SERPL-MCNC: 8.9 MG/DL — SIGNIFICANT CHANGE UP (ref 8.4–10.5)
CHLORIDE SERPL-SCNC: 102 MMOL/L — SIGNIFICANT CHANGE UP (ref 96–108)
CO2 SERPL-SCNC: 26 MMOL/L — SIGNIFICANT CHANGE UP (ref 22–31)
CREAT SERPL-MCNC: 0.79 MG/DL — SIGNIFICANT CHANGE UP (ref 0.5–1.3)
GLUCOSE SERPL-MCNC: 74 MG/DL — SIGNIFICANT CHANGE UP (ref 70–99)
HCT VFR BLD CALC: 33 % — LOW (ref 34.5–45)
HGB BLD-MCNC: 10.8 G/DL — LOW (ref 11.5–15.5)
MCHC RBC-ENTMCNC: 27.8 PG — SIGNIFICANT CHANGE UP (ref 27–34)
MCHC RBC-ENTMCNC: 32.7 G/DL — SIGNIFICANT CHANGE UP (ref 32–36)
MCV RBC AUTO: 85.1 FL — SIGNIFICANT CHANGE UP (ref 80–100)
PLATELET # BLD AUTO: 175 K/UL — SIGNIFICANT CHANGE UP (ref 150–400)
POTASSIUM SERPL-MCNC: 3.8 MMOL/L — SIGNIFICANT CHANGE UP (ref 3.5–5.3)
POTASSIUM SERPL-SCNC: 3.8 MMOL/L — SIGNIFICANT CHANGE UP (ref 3.5–5.3)
PROT SERPL-MCNC: 5.5 G/DL — LOW (ref 6–8.3)
RBC # BLD: 3.88 M/UL — SIGNIFICANT CHANGE UP (ref 3.8–5.2)
RBC # FLD: 14.7 % — SIGNIFICANT CHANGE UP (ref 10.3–16.9)
SODIUM SERPL-SCNC: 139 MMOL/L — SIGNIFICANT CHANGE UP (ref 135–145)
WBC # BLD: 7.4 K/UL — SIGNIFICANT CHANGE UP (ref 3.8–10.5)
WBC # FLD AUTO: 7.4 K/UL — SIGNIFICANT CHANGE UP (ref 3.8–10.5)

## 2017-09-16 PROCEDURE — 86850 RBC ANTIBODY SCREEN: CPT

## 2017-09-16 PROCEDURE — 85027 COMPLETE CBC AUTOMATED: CPT

## 2017-09-16 PROCEDURE — 93005 ELECTROCARDIOGRAM TRACING: CPT

## 2017-09-16 PROCEDURE — 83690 ASSAY OF LIPASE: CPT

## 2017-09-16 PROCEDURE — 90686 IIV4 VACC NO PRSV 0.5 ML IM: CPT

## 2017-09-16 PROCEDURE — 86900 BLOOD TYPING SEROLOGIC ABO: CPT

## 2017-09-16 PROCEDURE — 86780 TREPONEMA PALLIDUM: CPT

## 2017-09-16 PROCEDURE — 93306 TTE W/DOPPLER COMPLETE: CPT

## 2017-09-16 PROCEDURE — 99214 OFFICE O/P EST MOD 30 MIN: CPT

## 2017-09-16 PROCEDURE — 84550 ASSAY OF BLOOD/URIC ACID: CPT

## 2017-09-16 PROCEDURE — 88307 TISSUE EXAM BY PATHOLOGIST: CPT

## 2017-09-16 PROCEDURE — 90715 TDAP VACCINE 7 YRS/> IM: CPT

## 2017-09-16 PROCEDURE — 85610 PROTHROMBIN TIME: CPT

## 2017-09-16 PROCEDURE — 85730 THROMBOPLASTIN TIME PARTIAL: CPT

## 2017-09-16 PROCEDURE — 80053 COMPREHEN METABOLIC PANEL: CPT

## 2017-09-16 PROCEDURE — 76705 ECHO EXAM OF ABDOMEN: CPT

## 2017-09-16 PROCEDURE — 85025 COMPLETE CBC W/AUTO DIFF WBC: CPT

## 2017-09-16 PROCEDURE — 36415 COLL VENOUS BLD VENIPUNCTURE: CPT

## 2017-09-16 PROCEDURE — 85384 FIBRINOGEN ACTIVITY: CPT

## 2017-09-16 PROCEDURE — 81001 URINALYSIS AUTO W/SCOPE: CPT

## 2017-09-16 PROCEDURE — 83615 LACTATE (LD) (LDH) ENZYME: CPT

## 2017-09-16 PROCEDURE — 82150 ASSAY OF AMYLASE: CPT

## 2017-09-16 PROCEDURE — 86901 BLOOD TYPING SEROLOGIC RH(D): CPT

## 2017-09-16 RX ORDER — TETANUS TOXOID, REDUCED DIPHTHERIA TOXOID AND ACELLULAR PERTUSSIS VACCINE, ADSORBED 5; 2.5; 8; 8; 2.5 [IU]/.5ML; [IU]/.5ML; UG/.5ML; UG/.5ML; UG/.5ML
0.5 SUSPENSION INTRAMUSCULAR ONCE
Qty: 0 | Refills: 0 | Status: COMPLETED | OUTPATIENT
Start: 2017-09-16 | End: 2017-09-16

## 2017-09-16 RX ADMIN — Medication 1 TABLET(S): at 12:24

## 2017-09-16 RX ADMIN — SODIUM CHLORIDE 3 MILLILITER(S): 9 INJECTION INTRAMUSCULAR; INTRAVENOUS; SUBCUTANEOUS at 06:16

## 2017-09-16 RX ADMIN — Medication 600 MILLIGRAM(S): at 05:00

## 2017-09-16 RX ADMIN — MAGNESIUM HYDROXIDE 30 MILLILITER(S): 400 TABLET, CHEWABLE ORAL at 00:09

## 2017-09-16 RX ADMIN — SIMETHICONE 80 MILLIGRAM(S): 80 TABLET, CHEWABLE ORAL at 00:09

## 2017-09-16 RX ADMIN — FAMOTIDINE 20 MILLIGRAM(S): 10 INJECTION INTRAVENOUS at 12:24

## 2017-09-16 RX ADMIN — Medication 600 MILLIGRAM(S): at 10:11

## 2017-09-16 RX ADMIN — Medication 100 MILLIGRAM(S): at 12:24

## 2017-09-16 RX ADMIN — TETANUS TOXOID, REDUCED DIPHTHERIA TOXOID AND ACELLULAR PERTUSSIS VACCINE, ADSORBED 0.5 MILLILITER(S): 5; 2.5; 8; 8; 2.5 SUSPENSION INTRAMUSCULAR at 10:10

## 2017-09-16 RX ADMIN — Medication 600 MILLIGRAM(S): at 04:11

## 2017-09-16 RX ADMIN — SODIUM CHLORIDE 3 MILLILITER(S): 9 INJECTION INTRAMUSCULAR; INTRAVENOUS; SUBCUTANEOUS at 00:08

## 2017-09-16 RX ADMIN — SIMETHICONE 80 MILLIGRAM(S): 80 TABLET, CHEWABLE ORAL at 12:23

## 2017-09-16 RX ADMIN — SIMETHICONE 80 MILLIGRAM(S): 80 TABLET, CHEWABLE ORAL at 06:33

## 2017-09-16 RX ADMIN — Medication 600 MILLIGRAM(S): at 11:00

## 2017-09-16 NOTE — CONSULT NOTE ADULT - SUBJECTIVE AND OBJECTIVE BOX
HPI:  31yr old F with PMHx significant for Obesity sp recent delivery of a baby boy whom GI was consulted on for evaluation of abdominal pain, and elevated alkaline phosphatase.  According to patient she has been having some abdominal pain (mostly located all over abdomen, intermittent, waxes and wanes in intensity, 8/10 at worst, nil radiation to back, no identified aggravating or alleviating factors, no associated n/v/d. She has been constipated for the last 4 days since her delivery (but notes that this has been an ongoing problem during the pregnancy).   She denies any RUQ pain, change in the color of her eyes, fever, chill, sick contacts, recent travel, cough, chest pain, palpitations, diaphoresis, SOB.    PAST MEDICAL & SURGICAL HISTORY:  Obeisty    REVIEW OF SYSTEMS  Apart from items noted in the HPI a 10 point ROS was performed and was negative	    MEDICATIONS  (STANDING):  lactated ringers. 1000 milliLiter(s) (75 mL/Hr) IV Continuous <Continuous>  oxytocin Infusion 1 milliUNIT(s)/Min (1 mL/Hr) IV Continuous <Continuous>  fentaNYL (2 MICROgram(s)/mL) + BUpivacaine 0.1% in 0.9% Sodium Chloride PCEA 250 milliLiter(s) Epidural PCA Continuous  sodium chloride 0.9% lock flush 3 milliLiter(s) IV Push every 8 hours  oxytocin Infusion 41.667 milliUNIT(s)/Min (125 mL/Hr) IV Continuous <Continuous>  prenatal multivitamin 1 Tablet(s) Oral daily  simethicone 80 milliGRAM(s) Chew every 6 hours  magnesium hydroxide Suspension 30 milliLiter(s) Oral two times a day  famotidine    Tablet 20 milliGRAM(s) Oral daily    MEDICATIONS  (PRN):  acetaminophen   Tablet. 650 milliGRAM(s) Oral every 6 hours PRN Mild Pain  acetaminophen   Tablet 650 milliGRAM(s) Oral every 6 hours PRN For Temp greater than 38.5 C (101.3 F)  ibuprofen  Tablet 600 milliGRAM(s) Oral every 6 hours PRN Moderate Pain  oxyCODONE    5 mG/acetaminophen 325 mG 2 Tablet(s) Oral every 6 hours PRN Severe Pain  hydrocortisone 1% Cream 1 Application(s) Topical every 4 hours PRN Moderate to Severe Perineal Pain  benzocaine 20%/menthol 0.5% Spray 1 Spray(s) Topical every 6 hours PRN Perineal discomfort  dibucaine 1% Ointment 1 Application(s) Topical every 4 hours PRN Perineal Discomfort  lanolin Ointment 1 Application(s) Topical every 6 hours PRN Sore Nipples  witch hazel Pads 1 Application(s) Topical every 4 hours PRN Perineal Discomfort  diphenhydrAMINE   Capsule 25 milliGRAM(s) Oral every 6 hours PRN Itching  glycerin Suppository - Adult 1 Suppository(s) Rectal at bedtime PRN Mild Constipation  docusate sodium 100 milliGRAM(s) Oral two times a day PRN Stool Softening      Allergies  No Known Allergies    Intolerances    SOCIAL HISTORY:  Denies toxic or illicit habits    FAMILY HISTORY:  Reviewed with patient and non contributory to current illness    Vital Signs Last 24 Hrs  T(C): 36.3 (16 Sep 2017 05:56), Max: 36.8 (15 Sep 2017 14:00)  T(F): 97.3 (16 Sep 2017 05:56), Max: 98.3 (15 Sep 2017 14:00)  HR: 76 (16 Sep 2017 05:56) (76 - 88)  BP: 101/55 (16 Sep 2017 05:56) (101/55 - 140/91)  BP(mean): --  RR: 20 (16 Sep 2017 05:56) (17 - 20)  SpO2: 96% (16 Sep 2017 05:56) (96% - 99%)    PHYSICAL EXAM:  GEN - young F lying in bed in no obvious distress, anicteric, afebrile, not pale  Respiratory: clear to auscultation  Cardiovascular: s1 s2 no M RRR  Gastrointestinal: full, gravid, soft, BS+, NT, NR, NG, no masses or organs palpated  Extremities: + pitting LE edema  Neurological: AAOx3 non focal    LABS:                        10.8   7.4   )-----------( 175      ( 16 Sep 2017 06:56 )             33.0     09-16    139  |  102  |  8   ----------------------------<  74  3.8   |  26  |  0.79    Ca    8.9      16 Sep 2017 06:56    TPro  5.5<L>  /  Alb  2.9<L>  /  TBili  0.2  /  DBili  x   /  AST  23  /  ALT  23  /  AlkPhos  975<H>  09-16    PT/INR - ( 15 Sep 2017 09:50 )   PT: 9.9 sec;   INR: 0.89          PTT - ( 15 Sep 2017 09:50 )  PTT:31.2 sec  Urinalysis Basic - ( 15 Sep 2017 11:05 )    Color: x / Appearance: x / SG: x / pH: x  Gluc: x / Ketone: x  / Bili: x / Urobili: x   Blood: x / Protein: x / Nitrite: x   Leuk Esterase: x / RBC: > 10 /HPF / WBC 5-10 /HPF   Sq Epi: x / Non Sq Epi: Moderate /HPF / Bacteria: Present /HPF      RADIOLOGY & ADDITIONAL STUDIES:  US Abdomen Limited (09.15.17 @ 14:41) >  IMPRESSION:  Possible mild hepatic steatosis. No cholelithiasis or biliary dilatation.   Patent hepatic and portal veins.

## 2017-09-16 NOTE — CONSULT NOTE ADULT - ATTENDING COMMENTS
Case was discussed with GI fellow, but pt ws not seen by me; she was discharged before I was able to see her
Agree w Consult Note, Referral/Consultation, Subjective and Objective, Assessment and Recommendation

## 2017-09-16 NOTE — CONSULT NOTE ADULT - ASSESSMENT
31yr old F with PMHx significant for Obesity, sp recent delivery of a baby boy whom GI was consulted on for evaluation of abdominal pain, and elevated alkaline phosphatase.    PLAN -   # Abdominal pain -   - could be related to her recent delivery (with uterine contractions), or constipation  - pain control per primary team  - will recommend miralax 1 packet BID if prune juice not helping  - can reduce after bowel movement  - given normal bilirubin, aminotransferases, and normal USS less likely due to biliary disease    # Elevated Alkaline phosphatase -   - likely related to normal pregnancy changes and could be derived from the placenta  - seems to be trending down appropriately  - will recommend getting a Gamma glutamyl transpeptidase level, 5 nucleotidase level, to determine if source is from her liver  - an acute hepatitis panel will likely not be very helpful as her aminotransferases are not elevated  - if she will be discharged today - will recommend getting repeat labs late next week to make sure alk phos still coming down    Discussed with attending Dr Howard  GI will sign off for now, please reconsult as needed

## 2017-09-16 NOTE — PROGRESS NOTE ADULT - SUBJECTIVE AND OBJECTIVE BOX
Patient evaluated at bedside.  No acute events overnight.  She reports pain is well controlled with OPM.  She denies heavy vaginal bleeding or perineal discomfort.  She has been ambulating without assistance, voiding spontaneously, and is breastfeeding.    Physical Exam:  T(C): 36.3 (09-16-17 @ 05:56), Max: 36.6 (09-15-17 @ 22:31)  HR: 76 (09-16-17 @ 05:56) (76 - 81)  BP: 101/55 (09-16-17 @ 05:56) (101/55 - 140/91)  RR: 20 (09-16-17 @ 05:56) (20 - 20)  SpO2: 96% (09-16-17 @ 05:56) (96% - 99%)  Wt(kg): --    GA: NAD, AAOx3  Abd: soft, nontender, nondistended, no rebound or guarding, uterus firm at midline,   fundus below umbilicus  : lochia WNL  Extremities: no swelling or calf tenderness                            10.8   7.4   )-----------( 175      ( 16 Sep 2017 06:56 )             33.0     09-16    139  |  102  |  8   ----------------------------<  74  3.8   |  26  |  0.79    Ca    8.9      16 Sep 2017 06:56    TPro  5.5<L>  /  Alb  2.9<L>  /  TBili  0.2  /  DBili  x   /  AST  23  /  ALT  23  /  AlkPhos  975<H>  09-16

## 2017-09-16 NOTE — PROGRESS NOTE ADULT - SUBJECTIVE AND OBJECTIVE BOX
Patient evaluated at bedside.   She reports pain is well controlled with OPM.  She has been ambulating without assistance, voiding spontaneously, passing gas, tolerating regular diet and is breastfeeding.    She denies HA, dizziness, CP, palpitations, SOB, n/v, or heavy vaginal bleeding.    Physical Exam:  vss  Gen: NAD  Abd: + BS, soft, nontender, nondistended, no rebound or guarding  uterus firm at midline,   fb below umbilicus  : lochia WNL  Extremities: no swelling or calf tenderness    ppd2 stable  s/p gi consult, will follow up as outpt

## 2017-09-19 DIAGNOSIS — Z3A.00 WEEKS OF GESTATION OF PREGNANCY NOT SPECIFIED: ICD-10-CM

## 2017-09-19 DIAGNOSIS — Z3A.40 40 WEEKS GESTATION OF PREGNANCY: ICD-10-CM

## 2017-09-19 DIAGNOSIS — O26.899 OTHER SPECIFIED PREGNANCY RELATED CONDITIONS, UNSPECIFIED TRIMESTER: ICD-10-CM

## 2017-09-20 LAB — SURGICAL PATHOLOGY STUDY: SIGNIFICANT CHANGE UP

## 2021-10-05 NOTE — DISCHARGE NOTE OB - BREAST MILK SUPPORTS STABLE NEWBORN BLOOD SUGAR
Plenty of fluids.  Take medications as directed.  Return for worsening symptoms.  Follow-up with Dr. Saucedo today or tomorrow.  
Statement Selected

## 2022-01-13 NOTE — PROGRESS NOTE ADULT - ATTENDING COMMENTS
Last refill 12/10/21  Last office visit 12/9/21      Script set to eprescribe per protocol.       plan on repeat labs, RUQ sono, alk phos very high

## 2022-05-06 NOTE — PATIENT PROFILE OB - BABY A: ID BAND NUMBER, DELIVERY
PA aware. keep heparin gtt at current rate of 11ml/hr. pt therapeutic x2, next aptt to be drawn 5/7 AM MD aware. As per MD to completed second bladder scan after next void. Night RN Nicole made aware. Pt safety maintained. Straight cath ordered. [id number]

## 2023-10-19 RX ORDER — CHLORHEXIDINE GLUCONATE 213 G/1000ML
1 SOLUTION TOPICAL ONCE
Refills: 0 | Status: DISCONTINUED | OUTPATIENT
Start: 2023-10-20 | End: 2023-11-03

## 2023-10-19 NOTE — ASU PATIENT PROFILE, ADULT - FALL HARM RISK - RISK INTERVENTIONS

## 2023-10-19 NOTE — ASU PATIENT PROFILE, ADULT - CAREGIVER NAME
[facility]  ARIE Boogie.P.R.N.  [address]  [place] [address]  Fax: [phone]   Authorization for Release/Disclosure of   Protected Health Information   Name: YVROSE ROWELL : 2002 SSN: [id number]   Address: [address] [place] [address] [place] [address] Phone:    [phone] (home)    I authorize the entity listed below to release/disclose the PHI below to:   [facility]/Sasha Vora A.P.R.N. and ARIE Boogie.P.R.N.   Provider or Entity Name:  Dr. Arguello   Address   City, [facility], [facility]   Phone:      Fax:     Reason for request: continuity of care   Information to be released:    [  ] LAST COLONOSCOPY,  including any PATH REPORT and follow-up  [  ] LAST FIT/COLOGUARD RESULT [  ] LAST DEXA  [  ] LAST MAMMOGRAM  [  ] LAST PAP  [  ] LAST LABS [  ] RETINA EXAM REPORT  [  ] IMMUNIZATION RECORDS  [ x ] Release all info      [  ] Check here and initial the line next to each item to release ALL health information INCLUDING  _____ Care and treatment for drug and / or alcohol abuse  _____ HIV testing, infection status, or AIDS  _____ Genetic Testing    DATES OF SERVICE OR TIME PERIOD TO BE DISCLOSED: _____________  I understand and acknowledge that:  * This Authorization may be revoked at any time by you in writing, except if your health information has already been used or disclosed.  * Your health information that will be used or disclosed as a result of you signing this authorization could be re-disclosed by the recipient. If this occurs, your re-disclosed health information may no longer be protected by State or Federal laws.  * You may refuse to sign this Authorization. Your refusal will not affect your ability to obtain treatment.  * This Authorization becomes effective upon signing and will  on (date) __________.      If no date is indicated, this Authorization will  one (1) year from the signature date.    Name: Yvrose Rowell    Signature:   Date:      2/28/2018       PLEASE FAX REQUESTED RECORDS BACK TO: [phone]   Jt Poe

## 2023-10-19 NOTE — ASU PATIENT PROFILE, ADULT - NSICDXPASTSURGICALHX_GEN_ALL_CORE_FT
PAST SURGICAL HISTORY:  Encounter for IUD removal     H/O surgical removal of Bartholin’s gland cyst     S/P appendectomy     [place] teeth removed

## 2023-10-19 NOTE — ASU PATIENT PROFILE, ADULT - NSICDXPASTMEDICALHX_GEN_ALL_CORE_FT
PAST MEDICAL HISTORY:  Anxiety and depression not on meds     PAST MEDICAL HISTORY:  Acid reflux     Anxiety and depression not on meds

## 2023-10-19 NOTE — ASU PATIENT PROFILE, ADULT - NS PREOP UNDERSTANDS INFO
npo after 12mn, bring ID and insurance card, no valuables or  jewelry, wear comfortable clothing/yes npo after 12mn, bring ID        and insurance card, no valuables or  jewelry, wear comfortable clothing/yes

## 2023-10-20 ENCOUNTER — OUTPATIENT (OUTPATIENT)
Dept: OUTPATIENT SERVICES | Facility: HOSPITAL | Age: 38
LOS: 1 days | Discharge: HOME | End: 2023-10-20

## 2023-10-20 VITALS
HEART RATE: 79 BPM | WEIGHT: 242.51 LBS | DIASTOLIC BLOOD PRESSURE: 80 MMHG | TEMPERATURE: 98 F | RESPIRATION RATE: 16 BRPM | HEIGHT: 62 IN | SYSTOLIC BLOOD PRESSURE: 130 MMHG | OXYGEN SATURATION: 97 %

## 2023-10-20 VITALS
SYSTOLIC BLOOD PRESSURE: 135 MMHG | OXYGEN SATURATION: 95 % | RESPIRATION RATE: 17 BRPM | DIASTOLIC BLOOD PRESSURE: 81 MMHG | HEART RATE: 79 BPM

## 2023-10-20 DIAGNOSIS — Z90.49 ACQUIRED ABSENCE OF OTHER SPECIFIED PARTS OF DIGESTIVE TRACT: Chronic | ICD-10-CM

## 2023-10-20 DIAGNOSIS — S62.609A FRACTURE OF UNSPECIFIED PHALANX OF UNSPECIFIED FINGER, INITIAL ENCOUNTER FOR CLOSED FRACTURE: ICD-10-CM

## 2023-10-20 DIAGNOSIS — Z30.432 ENCOUNTER FOR REMOVAL OF INTRAUTERINE CONTRACEPTIVE DEVICE: Chronic | ICD-10-CM

## 2023-10-20 DIAGNOSIS — K08.409 PARTIAL LOSS OF TEETH, UNSPECIFIED CAUSE, UNSPECIFIED CLASS: Chronic | ICD-10-CM

## 2023-10-20 DIAGNOSIS — Z98.890 OTHER SPECIFIED POSTPROCEDURAL STATES: Chronic | ICD-10-CM

## 2023-10-20 DEVICE — K-WIRE ZIMMER (STYLE 1) DOUBLE TROCAR 0.9MM X 6": Type: IMPLANTABLE DEVICE | Site: RIGHT | Status: FUNCTIONAL

## 2023-10-20 RX ORDER — ACETAMINOPHEN 500 MG
650 TABLET ORAL ONCE
Refills: 0 | Status: DISCONTINUED | OUTPATIENT
Start: 2023-10-20 | End: 2023-11-03

## 2023-10-20 RX ORDER — SODIUM CHLORIDE 9 MG/ML
1000 INJECTION, SOLUTION INTRAVENOUS
Refills: 0 | Status: DISCONTINUED | OUTPATIENT
Start: 2023-10-20 | End: 2023-11-03

## 2023-10-20 RX ORDER — OXYCODONE HYDROCHLORIDE 5 MG/1
5 TABLET ORAL ONCE
Refills: 0 | Status: DISCONTINUED | OUTPATIENT
Start: 2023-10-20 | End: 2023-10-20

## 2023-10-20 RX ORDER — ONDANSETRON 8 MG/1
4 TABLET, FILM COATED ORAL ONCE
Refills: 0 | Status: DISCONTINUED | OUTPATIENT
Start: 2023-10-20 | End: 2023-11-03

## 2023-10-20 NOTE — PRE-ANESTHESIA EVALUATION ADULT - NSANTHADDINFOFT_GEN_ALL_CORE
Discussed risks of general anesthesia, sedation, and peripheral nerve block-including risk of nerve injury. All questions answered and patient is in agreement    Discussed risks of sedation and general anesthesia. All questions answered and patient is in agreement    Discussed the emergency plan of general anesthesia and explained all of the risks and benefits of general anesthesia- including risk of cardiopulmonary compromise, eye injury, sore throat, airway injury, postoperative nausea and vomiting, and nerve injury. All questions were answered and patient is in agreement for sedation, understanding the risks of general anesthesia if the need arises

## 2023-10-20 NOTE — ASU DISCHARGE PLAN (ADULT/PEDIATRIC) - NS MD DC FALL RISK RISK
For information on Fall & Injury Prevention, visit: [email or web address] OR  [email or web address] OR  https://www.cdc.gov/steadi/patient.html

## 2023-10-20 NOTE — ASU DISCHARGE PLAN (ADULT/PEDIATRIC) - CARE PROVIDER_API CALL
Mario Franz  Sur[facility]  [address] [facility] [address] [facility], [address]  [place], [place] [address]  Phone: [phone]  Fax: [phone]  Follow Up Time:

## 2023-11-03 DIAGNOSIS — K21.9 GASTRO-ESOPHAGEAL REFLUX DISEASE WITHOUT ESOPHAGITIS: ICD-10-CM

## 2023-11-03 DIAGNOSIS — F41.9 ANXIETY DISORDER, UNSPECIFIED: ICD-10-CM

## 2023-11-03 DIAGNOSIS — Z91.040 LATEX ALLERGY STATUS: ICD-10-CM

## 2023-11-03 DIAGNOSIS — F32.A DEPRESSION, UNSPECIFIED: ICD-10-CM
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
## 2023-11-03 DIAGNOSIS — S62.636A DISPLACED FRACTURE OF DISTAL PHALANX OF RIGHT LITTLE FINGER, INITIAL ENCOUNTER FOR CLOSED FRACTURE: ICD-10-CM

## 2023-11-03 DIAGNOSIS — J45.909 UNSPECIFIED ASTHMA, UNCOMPLICATED: ICD-10-CM
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
## 2024-06-14 RX ORDER — FAMOTIDINE 10 MG/ML
1 INJECTION INTRAVENOUS
Refills: 0 | DISCHARGE

## (undated) DEVICE — DRAPE C ARM MINI PACK FOR 6800

## (undated) DEVICE — TOURNIQUET CUFF 18" DUAL PORT SINGLE BLADDER W PLC  (BLACK)

## (undated) DEVICE — MARKING PEN W RULER

## (undated) DEVICE — DRSG XEROFORM 1 X 8"

## (undated) DEVICE — GLV 8 PROTEXIS (WHITE)

## (undated) DEVICE — GLV 8.5 PROTEXIS (WHITE)

## (undated) DEVICE — SUT ETHILON 5-0 18" P-3

## (undated) DEVICE — WARMING BLANKET LOWER ADULT

## (undated) DEVICE — SLV COMPRESSION KNEE MED

## (undated) DEVICE — TOURNIQUET CUFF 24" DUAL PORT SINGLE BLADDER W PLC (BLACK)